# Patient Record
Sex: FEMALE | Race: WHITE | Employment: STUDENT | URBAN - METROPOLITAN AREA
[De-identification: names, ages, dates, MRNs, and addresses within clinical notes are randomized per-mention and may not be internally consistent; named-entity substitution may affect disease eponyms.]

---

## 2017-03-14 RX ORDER — LEVONORGESTREL AND ETHINYL ESTRADIOL 0.15-0.03
KIT ORAL
Qty: 91 TABLET | Refills: 0 | OUTPATIENT
Start: 2017-03-14

## 2017-03-25 ENCOUNTER — OFFICE VISIT (OUTPATIENT)
Dept: OBGYN CLINIC | Facility: CLINIC | Age: 32
End: 2017-03-25

## 2017-03-25 VITALS
DIASTOLIC BLOOD PRESSURE: 62 MMHG | HEART RATE: 72 BPM | WEIGHT: 139 LBS | BODY MASS INDEX: 23.16 KG/M2 | SYSTOLIC BLOOD PRESSURE: 102 MMHG | HEIGHT: 65 IN

## 2017-03-25 DIAGNOSIS — Z01.419 ENCOUNTER FOR WELL WOMAN EXAM WITH ROUTINE GYNECOLOGICAL EXAM: ICD-10-CM

## 2017-03-25 DIAGNOSIS — Z01.419 WELL WOMAN EXAM: Primary | ICD-10-CM

## 2017-03-25 PROCEDURE — 99395 PREV VISIT EST AGE 18-39: CPT | Performed by: OBSTETRICS & GYNECOLOGY

## 2017-03-25 PROCEDURE — 87491 CHLMYD TRACH DNA AMP PROBE: CPT | Performed by: OBSTETRICS & GYNECOLOGY

## 2017-03-25 PROCEDURE — 88175 CYTOPATH C/V AUTO FLUID REDO: CPT | Performed by: OBSTETRICS & GYNECOLOGY

## 2017-03-25 PROCEDURE — 87591 N.GONORRHOEAE DNA AMP PROB: CPT | Performed by: OBSTETRICS & GYNECOLOGY

## 2017-03-25 PROCEDURE — 87624 HPV HI-RISK TYP POOLED RSLT: CPT | Performed by: OBSTETRICS & GYNECOLOGY

## 2017-03-25 RX ORDER — LEVONORGESTREL / ETHINYL ESTRADIOL AND ETHINYL ESTRADIOL 150-30(84)
1 KIT ORAL DAILY
Qty: 1 PACKAGE | Refills: 3 | Status: SHIPPED | OUTPATIENT
Start: 2017-03-25 | End: 2017-06-24

## 2017-03-25 NOTE — PROGRESS NOTES
Angelina Guzman is a 32year old female [facility] Patient's last menstrual period was 2017 (approximate).  Patient presents with:  Wellness Visit: annual exam  .  C/o occasional spotting on OCP    OBSTETRICS HISTORY:  OB History    Para Term P denies blurred or double vision  Cardiovascular:  denies chest pain or palpitations  Respiratory:  denies shortness of breath  Gastrointestinal:  denies heartburn, abdominal pain, diarrhea or constipation  Genitourinary:  denies dysuria, incontinence, abno gynecological exam    Other orders  -     Levonorgest-Eth Estrad 91-Day (Piedad Diaz) 0.15-0.03 &0.01 MG Oral Tab; Take 1 tablet by mouth daily.

## 2017-03-27 LAB
C TRACH DNA SPEC QL NAA+PROBE: NEGATIVE
HPV I/H RISK 1 DNA SPEC QL NAA+PROBE: NEGATIVE
N GONORRHOEA DNA SPEC QL NAA+PROBE: NEGATIVE

## 2018-03-01 RX ORDER — LEVONORGESTREL / ETHINYL ESTRADIOL AND ETHINYL ESTRADIOL 150-30(84)
KIT ORAL
Qty: 91 TABLET | Refills: 0 | Status: SHIPPED | OUTPATIENT
Start: 2018-03-01 | End: 2018-06-08

## 2018-05-30 RX ORDER — LEVONORGESTREL / ETHINYL ESTRADIOL AND ETHINYL ESTRADIOL 150-30(84)
KIT ORAL
Qty: 91 TABLET | Refills: 0 | OUTPATIENT
Start: 2018-05-30

## 2018-06-08 ENCOUNTER — OFFICE VISIT (OUTPATIENT)
Dept: OBGYN CLINIC | Facility: CLINIC | Age: 33
End: 2018-06-08

## 2018-06-08 VITALS
HEIGHT: 65 IN | WEIGHT: 138 LBS | HEART RATE: 64 BPM | DIASTOLIC BLOOD PRESSURE: 60 MMHG | RESPIRATION RATE: 16 BRPM | BODY MASS INDEX: 22.99 KG/M2 | SYSTOLIC BLOOD PRESSURE: 110 MMHG

## 2018-06-08 DIAGNOSIS — Z12.4 CERVICAL CANCER SCREENING: ICD-10-CM

## 2018-06-08 DIAGNOSIS — Z11.3 SCREEN FOR STD (SEXUALLY TRANSMITTED DISEASE): ICD-10-CM

## 2018-06-08 DIAGNOSIS — Z01.419 ENCOUNTER FOR WELL WOMAN EXAM WITH ROUTINE GYNECOLOGICAL EXAM: Primary | ICD-10-CM

## 2018-06-08 DIAGNOSIS — R10.2 PELVIC PAIN: ICD-10-CM

## 2018-06-08 PROCEDURE — 87624 HPV HI-RISK TYP POOLED RSLT: CPT | Performed by: OBSTETRICS & GYNECOLOGY

## 2018-06-08 PROCEDURE — 87591 N.GONORRHOEAE DNA AMP PROB: CPT | Performed by: OBSTETRICS & GYNECOLOGY

## 2018-06-08 PROCEDURE — 99395 PREV VISIT EST AGE 18-39: CPT | Performed by: OBSTETRICS & GYNECOLOGY

## 2018-06-08 PROCEDURE — 88175 CYTOPATH C/V AUTO FLUID REDO: CPT | Performed by: OBSTETRICS & GYNECOLOGY

## 2018-06-08 PROCEDURE — 87491 CHLMYD TRACH DNA AMP PROBE: CPT | Performed by: OBSTETRICS & GYNECOLOGY

## 2018-06-08 RX ORDER — NORETHINDRONE ACETATE AND ETHINYL ESTRADIOL 1MG-20(21)
1 KIT ORAL DAILY
Qty: 1 PACKAGE | Refills: 11 | Status: SHIPPED | OUTPATIENT
Start: 2018-06-08 | End: 2019-04-29

## 2018-06-08 NOTE — PROGRESS NOTES
Maria Luisa Finch is a 28year old female [place] Patient's last menstrual period was 04/08/2018 (approximate).  Patient presents with:  Wellness Visit: annual  .  Patient c/o pelvic cramps and pain with intercourse, would like to change OCP to monthly  OB vision  Cardiovascular:  denies chest pain or palpitations  Respiratory:  denies shortness of breath  Gastrointestinal:  denies heartburn, abdominal pain, diarrhea or constipation  Genitourinary:  denies dysuria, incontinence, abnormal vaginal discharge, v disease)  -     CHLAMYDIA/GONOCOCCUS, RC; Future    Pelvic pain  -     US TRANSVAG/ABDOMINAL EMG ONLY; Future    Other orders  -     Norethin Ace-Eth Estrad-FE 1-20 MG-MCG Oral Tab; Take 1 tablet by mouth daily.

## 2018-06-19 ENCOUNTER — APPOINTMENT (OUTPATIENT)
Dept: OBGYN CLINIC | Facility: CLINIC | Age: 33
End: 2018-06-19

## 2019-04-29 RX ORDER — NORETHINDRONE ACETATE AND ETHINYL ESTRADIOL AND FERROUS FUMARATE 1MG-20(21)
KIT ORAL
Qty: 28 TABLET | Refills: 1 | Status: SHIPPED | OUTPATIENT
Start: 2019-04-29

## 2019-06-26 RX ORDER — NORETHINDRONE ACETATE AND ETHINYL ESTRADIOL AND FERROUS FUMARATE 1MG-20(21)
KIT ORAL
Qty: 28 TABLET | Refills: 1 | OUTPATIENT
Start: 2019-06-26

## 2019-06-27 ENCOUNTER — TELEPHONE (OUTPATIENT)
Dept: OBGYN CLINIC | Facility: CLINIC | Age: 34
End: 2019-06-27

## 2019-06-27 NOTE — TELEPHONE ENCOUNTER
Patient [facility]   [address] [address]  Ph. [phone]   Fax. [phone]    Will forward paper chart to scan stat.

## (undated) NOTE — MR AVS SNAPSHOT
After Visit Summary   3/25/2017    Jamal Noel    MRN: [id number]           Visit Information        Provider Department Dept Phone    3/25/2017  9:00 AM DO Lucina Corrigan [place] [phone]      Your Vitals Were     BP Pulse Ht request appointments. How Do I Sign Up? In your Internet browser, go to [email or web address]    Click on the Activate your Account if you have an activation code in the box under the *New User? section.      Enter your Rickey Napier will help us do so. For more information on [employer], please visit [email or web address]

## (undated) NOTE — MR AVS SNAPSHOT
[address]  [address] [phone]               Thank you for choosing us for your health care visit with [facility], DO.   We are glad to serve you and happy to provide you with this What changed: You were already taking a medication with the same name, and this prescription was added. Make sure you understand how and when to take each. Commonly known as:  SEASONIQUE           * Notice:   This list has 2 medication(s) that are the [facility]